# Patient Record
Sex: FEMALE | Race: BLACK OR AFRICAN AMERICAN | URBAN - METROPOLITAN AREA
[De-identification: names, ages, dates, MRNs, and addresses within clinical notes are randomized per-mention and may not be internally consistent; named-entity substitution may affect disease eponyms.]

---

## 2021-03-03 ENCOUNTER — OFFICE VISIT (OUTPATIENT)
Dept: FAMILY MEDICINE CLINIC | Age: 51
End: 2021-03-03
Payer: COMMERCIAL

## 2021-03-03 VITALS
RESPIRATION RATE: 16 BRPM | HEART RATE: 78 BPM | WEIGHT: 156 LBS | HEIGHT: 64 IN | BODY MASS INDEX: 26.63 KG/M2 | OXYGEN SATURATION: 98 % | DIASTOLIC BLOOD PRESSURE: 95 MMHG | SYSTOLIC BLOOD PRESSURE: 144 MMHG | TEMPERATURE: 98.1 F

## 2021-03-03 DIAGNOSIS — Z13.220 SCREENING FOR HYPERLIPIDEMIA: ICD-10-CM

## 2021-03-03 DIAGNOSIS — I10 ESSENTIAL HYPERTENSION: Primary | ICD-10-CM

## 2021-03-03 PROCEDURE — 99204 OFFICE O/P NEW MOD 45 MIN: CPT | Performed by: FAMILY MEDICINE

## 2021-03-03 RX ORDER — LISINOPRIL AND HYDROCHLOROTHIAZIDE 12.5; 2 MG/1; MG/1
TABLET ORAL
COMMUNITY
Start: 2021-02-28 | End: 2021-03-03 | Stop reason: ALTCHOICE

## 2021-03-03 RX ORDER — AMLODIPINE BESYLATE 5 MG/1
5 TABLET ORAL DAILY
Qty: 90 TAB | Refills: 1 | Status: SHIPPED | OUTPATIENT
Start: 2021-03-03 | End: 2021-08-20

## 2021-03-03 RX ORDER — LEVOTHYROXINE SODIUM 150 UG/1
TABLET ORAL
COMMUNITY
Start: 2021-02-28 | End: 2021-03-21

## 2021-03-03 RX ORDER — HYDROCHLOROTHIAZIDE 25 MG/1
25 TABLET ORAL DAILY
Qty: 90 TAB | Refills: 1 | Status: SHIPPED | OUTPATIENT
Start: 2021-03-03 | End: 2021-08-20

## 2021-03-03 NOTE — PROGRESS NOTES
Chief Complaint   Patient presents with    New Patient   [address]    Complete Physical     Patient reports some concerns about HTN. she is a 48y.o. year old female who presents for evalution. Here to establish care  Recently quit her job due to job burnout and she thought that would make her bp better  She was put on prinzide recently and she checks bp at home and is high   She started exercising 2 weeks ago, she walks 2 times a week          Reviewed PmHx, RxHx, FmHx, SocHx, AllgHx and updated and dated in the chart   Social History     Socioeconomic History    Marital status:                         Occupational History       Social Needs                                        Tobacco Use    Smoking status: Light Tobacco Smoker     Types: Cigars    Smokeless tobacco: Never Used    Tobacco comment: Occas. Substance and Sexual Activity    Alcohol use: Yes     Comment: 1-2 glasses per week Wine    Drug use: Never    Sexual activity: Yes     Partners: Male     Aspirin yes ____   No__x__ N/A____    There are no active problems to display for this patient.       Nurse notes were reviewed and copied and are correct  Review of Systems - negative except as listed above in the HPI    Objective:     Vitals:    03/03/21 1306 03/03/21 1332   BP: (!) 157/90 (!) 144/95   Pulse: 78    Resp: 16    Temp: 98.1 °F (36.7 °C)    TempSrc: Skin    SpO2: 98%    Weight: 156 lb (70.8 kg)    Height: 5' 3.5\" (1.613 m)      Physical Examination: General appearance - alert, well appearing, and in no distress   Mental status - alert, oriented to person, place, and time  Eyes - pupils equal and reactive, extraocular eye movements intact  Ears - bilateral TM's and external ear canals normal  Neck - supple, no significant adenopathy  Lymphatics - no palpable lymphadenopathy, no hepatosplenomegaly  Chest - clear to auscultation, no wheezes, rales or rhonchi, symmetric air entry  Heart - normal rate, regular rhythm, normal S1, S2, no murmurs, rubs, clicks or gallops  Abdomen - soft, nontender, nondistended, no masses or organomegaly  Neurological - alert, oriented, normal speech, no focal findings or movement disorder noted  Musculoskeletal - no joint tenderness, deformity or swelling  Extremities - peripheral pulses normal, no pedal edema, no clubbing or cyanosis  Skin - normal coloration and turgor, no rashes, no suspicious skin lesions noted         Assessment/ Plan:   Diagnoses and all orders for this visit:    1. Essential hypertension  -     hydroCHLOROthiazide (HYDRODIURIL) 25 mg tablet; Take 1 Tab by mouth daily. -     amLODIPine (NORVASC) 5 mg tablet; Take 1 Tab by mouth daily.  -     METABOLIC PANEL, COMPREHENSIVE; Future  -     TSH 3RD GENERATION; Future  Continue exercise at least 150 mins per week  Avoid sodium and stress    2. Screening for hyperlipidemia  -     LIPID PANEL; Future           ICD-10-CM ICD-9-CM    1. Essential hypertension  I10 401.9 hydroCHLOROthiazide (HYDRODIURIL) 25 mg tablet      amLODIPine (NORVASC) 5 mg tablet      METABOLIC PANEL, COMPREHENSIVE      TSH 3RD GENERATION      METABOLIC PANEL, COMPREHENSIVE      TSH 3RD GENERATION   2. Screening for hyperlipidemia  Z13.220 V77.91 LIPID PANEL      LIPID PANEL       I have discussed the diagnosis with the patient and the intended plan as seen in the above orders. The patient has received an after-visit summary and questions were answered concerning future plans. There are no Patient Instructions on file for this visit.

## 2021-03-03 NOTE — PROGRESS NOTES
1. Have you been to the ER, urgent care clinic since your last visit? Hospitalized since your last visit? No    2. Have you seen or consulted any other health care providers outside of the [address] [place] [address] since your last visit? Include any pap smears or colon screening. No      Дмитрий Pérez  Phone: [phone]  [facility]  Phone: [phone]    Prior PCP  [address]  Phone: [phone]    Chief Complaint   Patient presents with    New Patient    Establish Care    Complete Physical     Patient reports some concerns about HTN.       Visit Vitals  BP (!) 157/90 (BP 1 Location: Left arm, BP Patient Position: Sitting, BP Cuff Size: Adult)   Pulse 78   Temp 98.1 °F (36.7 °C) (Skin)   Resp 16   Ht 5' 3.5\" (1.613 m)   Wt 156 lb (70.8 kg)   SpO2 98%   BMI 27.20 kg/m²     3 most recent PHQ Screens 3/3/2021   Little interest or pleasure in doing things Not at all   Feeling down, depressed, irritable, or hopeless Not at all   Total Score PHQ 2 0

## 2021-03-04 LAB
ALBUMIN SERPL-MCNC: 4.2 G/DL (ref 3.8–4.8)
ALBUMIN/GLOB SERPL: 1.3 {RATIO} (ref 1.2–2.2)
ALP SERPL-CCNC: 81 IU/L (ref 39–117)
ALT SERPL-CCNC: 14 IU/L (ref 0–32)
AST SERPL-CCNC: 14 IU/L (ref 0–40)
BILIRUB SERPL-MCNC: 0.6 MG/DL (ref 0–1.2)
BUN SERPL-MCNC: 14 MG/DL (ref 6–24)
BUN/CREAT SERPL: 16 (ref 9–23)
CALCIUM SERPL-MCNC: 9.5 MG/DL (ref 8.7–10.2)
CHLORIDE SERPL-SCNC: 104 MMOL/L (ref 96–106)
CHOLEST SERPL-MCNC: 185 MG/DL (ref 100–199)
CO2 SERPL-SCNC: 23 MMOL/L (ref 20–29)
CREAT SERPL-MCNC: 0.85 MG/DL (ref 0.57–1)
GLOBULIN SER CALC-MCNC: 3.2 G/DL (ref 1.5–4.5)
GLUCOSE SERPL-MCNC: 90 MG/DL (ref 65–99)
HDLC SERPL-MCNC: 44 MG/DL
LDLC SERPL CALC-MCNC: 130 MG/DL (ref 0–99)
POTASSIUM SERPL-SCNC: 4.2 MMOL/L (ref 3.5–5.2)
PROT SERPL-MCNC: 7.4 G/DL (ref 6–8.5)
SODIUM SERPL-SCNC: 142 MMOL/L (ref 134–144)
TRIGL SERPL-MCNC: 60 MG/DL (ref 0–149)
TSH SERPL DL<=0.005 MIU/L-ACNC: 0.01 UIU/ML (ref 0.45–4.5)
VLDLC SERPL CALC-MCNC: 11 MG/DL (ref 5–40)

## 2021-03-18 ENCOUNTER — OFFICE VISIT (OUTPATIENT)
Dept: FAMILY MEDICINE CLINIC | Age: 51
End: 2021-03-18
Payer: COMMERCIAL

## 2021-03-18 VITALS
HEART RATE: 90 BPM | DIASTOLIC BLOOD PRESSURE: 83 MMHG | HEIGHT: 64 IN | OXYGEN SATURATION: 99 % | TEMPERATURE: 97.3 F | RESPIRATION RATE: 16 BRPM | BODY MASS INDEX: 26.46 KG/M2 | SYSTOLIC BLOOD PRESSURE: 136 MMHG | WEIGHT: 155 LBS

## 2021-03-18 DIAGNOSIS — R79.89 ABNORMAL TSH: ICD-10-CM

## 2021-03-18 DIAGNOSIS — I10 ESSENTIAL HYPERTENSION: Primary | ICD-10-CM

## 2021-03-18 PROCEDURE — 99214 OFFICE O/P EST MOD 30 MIN: CPT | Performed by: FAMILY MEDICINE

## 2021-03-18 NOTE — PROGRESS NOTES
Chief Complaint   Patient presents with    Follow-up    Hypertension    Cholesterol Problem     she is a 48y.o. year old female who presents for evalution. She was taking lisinopril combined w hctz  2 weeks ago and bp was not controlled  I switched to norvasc and kept hctz at 25 mg  She is now well controlled    Reviewed PmHx, RxHx, FmHx, SocHx, AllgHx and updated and dated in the chart. Aspirin yes ____   No____ N/A____    There are no active problems to display for this patient. Nurse notes were reviewed and copied and are correct  Review of Systems - negative except as listed above in the HPI    Objective:     Vitals:    03/18/21 1509   BP: 136/83   Pulse: 90   Resp: 16   Temp: 97.3 °F (36.3 °C)   TempSrc: Skin   SpO2: 99%   Weight: 155 lb (70.3 kg)   Height: 5' 3.5\" (1.613 m)       Physical Examination: General appearance - alert, well appearing, and in no distress  Mental status - alert, oriented to person, place, and time  Chest - clear to auscultation, no wheezes, rales or rhonchi, symmetric air entry  Heart - normal rate, regular rhythm, normal S1, S2, no murmurs, rubs, clicks or gallops      Assessment/ Plan:   Diagnoses and all orders for this visit:    1. Essential hypertension  Cont same meds  2. Abnormal TSH  -     TSH 3RD GENERATION; Future  -     T3 TOTAL; Future  -     T4, FREE; Future    Need to determine if the current dose is still appropriate      ICD-10-CM ICD-9-CM    1. Essential hypertension  I10 401.9    2. Abnormal TSH  R79.89 790.6 TSH 3RD GENERATION      T3 TOTAL      T4, FREE      T4, FREE      T3 TOTAL      TSH 3RD GENERATION      CANCELED: TSH 3RD GENERATION      CANCELED: T4, FREE      CANCELED: T3 TOTAL      CANCELED: TSH 3RD GENERATION      CANCELED: T4, FREE      CANCELED: T3 TOTAL       I have discussed the diagnosis with the patient and the intended plan as seen in the above orders.   The patient has received an after-visit summary and questions were answered concerning future plans. There are no Patient Instructions on file for this visit.

## 2021-03-18 NOTE — PROGRESS NOTES
1. Have you been to the ER, urgent care clinic since your last visit? Hospitalized since your last visit? No    2. Have you seen or consulted any other health care providers outside of the [address] [place] [address] since your last visit? Include any pap smears or colon screening.  No     Chief Complaint   Patient presents with    Follow-up    Hypertension    Cholesterol Problem     Health Maintenance Due   Topic Date Due    Hepatitis C Screening  Never done    Pneumococcal 0-64 years (1 of 1 - PPSV23) Never done    COVID-19 Vaccine (1) Never done    DTaP/Tdap/Td series (1 - Tdap) Never done    PAP AKA CERVICAL CYTOLOGY  Never done    Shingrix Vaccine Age 50> (1 of 2) Never done    Colorectal Cancer Screening Combo  Never done    Breast Cancer Screen Mammogram  Never done    Flu Vaccine (1) Never done     Visit Vitals  /83 (BP 1 Location: Left arm, BP Patient Position: Sitting, BP Cuff Size: Adult long)   Pulse 90   Temp 97.3 °F (36.3 °C) (Skin)   Resp 16   Ht 5' 3.5\" (1.613 m)   Wt 155 lb (70.3 kg)   SpO2 99%   BMI 27.03 kg/m²

## 2021-03-19 LAB
T4 FREE SERPL-MCNC: 1.9 NG/DL (ref 0.8–1.5)
TSH SERPL DL<=0.05 MIU/L-ACNC: <0.01 UIU/ML (ref 0.36–3.74)

## 2021-03-20 LAB — T3 SERPL-MCNC: 142 NG/DL (ref 71–180)

## 2021-03-21 DIAGNOSIS — E03.9 ACQUIRED HYPOTHYROIDISM: Primary | ICD-10-CM

## 2021-03-21 RX ORDER — LEVOTHYROXINE SODIUM 137 UG/1
137 TABLET ORAL
Qty: 90 TAB | Refills: 1 | Status: SHIPPED | OUTPATIENT
Start: 2021-03-21 | End: 2021-05-12

## 2021-03-21 NOTE — PROGRESS NOTES
The thyroid level is higher than it should be. The pill dose needs to be reduced  I am decreasing the pill from 150 MCG to 137 mcg. We need to repeat the test in a month after starting the new dose.

## 2021-03-26 ENCOUNTER — TELEPHONE (OUTPATIENT)
Dept: FAMILY MEDICINE CLINIC | Age: 51
End: 2021-03-26

## 2021-03-26 NOTE — TELEPHONE ENCOUNTER
Medical record request sent via fax to [facility], [place]  Fax#: [phone]  File#: [id number]  Entire Record  Pap  Mammo

## 2021-05-06 ENCOUNTER — OFFICE VISIT (OUTPATIENT)
Dept: FAMILY MEDICINE CLINIC | Age: 51
End: 2021-05-06
Payer: COMMERCIAL

## 2021-05-06 VITALS
BODY MASS INDEX: 26.29 KG/M2 | RESPIRATION RATE: 16 BRPM | DIASTOLIC BLOOD PRESSURE: 83 MMHG | HEIGHT: 64 IN | WEIGHT: 154 LBS | HEART RATE: 74 BPM | OXYGEN SATURATION: 97 % | SYSTOLIC BLOOD PRESSURE: 133 MMHG | TEMPERATURE: 97.8 F

## 2021-05-06 DIAGNOSIS — E78.00 HYPERCHOLESTEROLEMIA: ICD-10-CM

## 2021-05-06 DIAGNOSIS — Z13.1 SCREENING FOR DIABETES MELLITUS: ICD-10-CM

## 2021-05-06 DIAGNOSIS — Z12.11 SCREEN FOR COLON CANCER: ICD-10-CM

## 2021-05-06 DIAGNOSIS — R79.89 ABNORMAL TSH: ICD-10-CM

## 2021-05-06 DIAGNOSIS — Z11.59 NEED FOR HEPATITIS C SCREENING TEST: ICD-10-CM

## 2021-05-06 DIAGNOSIS — I10 ESSENTIAL HYPERTENSION: Primary | ICD-10-CM

## 2021-05-06 LAB
ALBUMIN SERPL-MCNC: 3.9 G/DL (ref 3.5–5)
ALBUMIN/GLOB SERPL: 1.1 {RATIO} (ref 1.1–2.2)
ALP SERPL-CCNC: 86 U/L (ref 45–117)
ALT SERPL-CCNC: 20 U/L (ref 12–78)
ANION GAP SERPL CALC-SCNC: 7 MMOL/L (ref 5–15)
AST SERPL-CCNC: 13 U/L (ref 15–37)
BILIRUB SERPL-MCNC: 0.7 MG/DL (ref 0.2–1)
BUN SERPL-MCNC: 12 MG/DL (ref 6–20)
BUN/CREAT SERPL: 17 (ref 12–20)
CALCIUM SERPL-MCNC: 9.3 MG/DL (ref 8.5–10.1)
CHLORIDE SERPL-SCNC: 104 MMOL/L (ref 97–108)
CHOLEST SERPL-MCNC: 200 MG/DL
CO2 SERPL-SCNC: 30 MMOL/L (ref 21–32)
CREAT SERPL-MCNC: 0.69 MG/DL (ref 0.55–1.02)
EST. AVERAGE GLUCOSE BLD GHB EST-MCNC: 111 MG/DL
GLOBULIN SER CALC-MCNC: 3.5 G/DL (ref 2–4)
GLUCOSE SERPL-MCNC: 96 MG/DL (ref 65–100)
HBA1C MFR BLD: 5.5 % (ref 4–5.6)
HCV AB SERPL QL IA: NONREACTIVE
HCV COMMENT,HCGAC: NORMAL
HDLC SERPL-MCNC: 61 MG/DL
HDLC SERPL: 3.3 {RATIO} (ref 0–5)
LDLC SERPL CALC-MCNC: 121.4 MG/DL (ref 0–100)
LIPID PROFILE,FLP: ABNORMAL
POTASSIUM SERPL-SCNC: 3.5 MMOL/L (ref 3.5–5.1)
PROT SERPL-MCNC: 7.4 G/DL (ref 6.4–8.2)
SODIUM SERPL-SCNC: 141 MMOL/L (ref 136–145)
TRIGL SERPL-MCNC: 88 MG/DL (ref ?–150)
TSH SERPL DL<=0.05 MIU/L-ACNC: 0.01 UIU/ML (ref 0.36–3.74)
VLDLC SERPL CALC-MCNC: 17.6 MG/DL

## 2021-05-06 PROCEDURE — 99214 OFFICE O/P EST MOD 30 MIN: CPT | Performed by: FAMILY MEDICINE

## 2021-05-06 NOTE — PROGRESS NOTES
1. Have you been to the ER, urgent care clinic since your last visit? Hospitalized since your last visit? No    2. Have you seen or consulted any other health care providers outside of the [address] [place] [address] since your last visit? Include any pap smears or colon screening. No     Chief Complaint   Patient presents with    Complete Physical     No pap followed by Obgyn     Visit Vitals  /83 (BP 1 Location: Left arm, BP Patient Position: Sitting, BP Cuff Size: Adult)   Pulse 74   Temp 97.8 °F (36.6 °C) (Skin)   Resp 16   Ht 5' 3.5\" (1.613 m)   Wt 154 lb (69.9 kg)   SpO2 97%   BMI 26.85 kg/m²       Health Maintenance Due   Topic Date Due    Hepatitis C Screening  Never done    Pneumococcal 0-64 years (1 of 1 - PPSV23) Never done    COVID-19 Vaccine (1) Never done    DTaP/Tdap/Td series (1 - Tdap) Never done    PAP AKA CERVICAL CYTOLOGY  Never done    Shingrix Vaccine Age 50> (1 of 2) Never done    Colorectal Cancer Screening Combo  Never done    Breast Cancer Screen Mammogram  Never done     3 most recent PHQ Screens 5/6/2021   Little interest or pleasure in doing things Not at all   Feeling down, depressed, irritable, or hopeless Not at all   Total Score PHQ 2 0     Abuse Screening Questionnaire 5/6/2021   Do you ever feel afraid of your partner? N   Are you in a relationship with someone who physically or mentally threatens you? N   Is it safe for you to go home?  Dominick Solis

## 2021-05-06 NOTE — PROGRESS NOTES
Chief Complaint   Patient presents with    Complete Physical     No pap followed by Obgyn     she is a 48y.o. year old female who presents for evalution. mammagram and pap done Dec  Her tsh was abn last month and the dose was decreased  She needs a repeat tsh  No c/o of any chect pain or SOB or rhythm disturbances  Because she has some acute and chronic issues carlota need to be addressed today I will address those and do a physical on a separate visit  Reviewed PmHx, RxHx, FmHx, SocHx, AllgHx and updated and dated in the chart. Aspirin yes ____   No____ N/A____    There are no active problems to display for this patient. Nurse notes were reviewed and copied and are correct  Review of Systems - negative except as listed above in the HPI    Objective:     Vitals:    05/06/21 1307   BP: 133/83   Pulse: 74   Resp: 16   Temp: 97.8 °F (36.6 °C)   TempSrc: Skin   SpO2: 97%   Weight: 154 lb (69.9 kg)   Height: 5' 3.5\" (1.613 m)     Physical Examination: General appearance - alert, well appearing, and in no distress  Mental status - alert, oriented to person, place, and time  Eyes - pupils equal and reactive, extraocular eye movements intact  Ears - bilateral TM's and external ear canals normal  Neck - supple, no significant adenopathy  Chest - clear to auscultation, no wheezes, rales or rhonchi, symmetric air entry  Heart - normal rate, regular rhythm, normal S1, S2, no murmurs, rubs, clicks or gallops  Abdomen - soft, nontender, nondistended, no masses or organomegaly  Musculoskeletal - no joint tenderness, deformity or swelling  Extremities - peripheral pulses normal, no pedal edema, no clubbing or cyanosis         Assessment/ Plan:   Diagnoses and all orders for this visit:    1. Essential hypertension  -     METABOLIC PANEL, COMPREHENSIVE; Future  -     TSH 3RD GENERATION; Future  -     METABOLIC PANEL, COMPREHENSIVE; Future  Well controlled on current meds  2.  Abnormal TSH  -     TSH 3RD GENERATION; Future  Will adjust as needed  3. Need for hepatitis C screening test  -     HEPATITIS C AB; Future    4. Screening for diabetes mellitus  -     HEMOGLOBIN A1C WITH EAG; Future    5. Hypercholesterolemia  -     LIPID PANEL; Future    6. Screen for colon cancer  -     REFERRAL TO GASTROENTEROLOGY           ICD-10-CM ICD-9-CM    1. Essential hypertension  G16 619.6 METABOLIC PANEL, COMPREHENSIVE      HEMOGLOBIN A1C WITH EAG      METABOLIC PANEL, COMPREHENSIVE      TSH 3RD GENERATION      METABOLIC PANEL, COMPREHENSIVE      METABOLIC PANEL, COMPREHENSIVE      TSH 3RD GENERATION      CANCELED: HEPATITIS C AB      CANCELED: HEMOGLOBIN A1C WITH EAG      CANCELED: TSH 3RD GENERATION      CANCELED: HEPATITIS C AB      CANCELED: TSH 3RD GENERATION   2. Abnormal TSH  R79.89 790.6 TSH 3RD GENERATION      TSH 3RD GENERATION      CANCELED: TSH 3RD GENERATION      CANCELED: TSH 3RD GENERATION   3. Need for hepatitis C screening test  Z11.59 V73.89 HEPATITIS C AB      HEPATITIS C AB      CANCELED: HEPATITIS C AB      CANCELED: HEPATITIS C AB   4. Screening for diabetes mellitus  Z13.1 V77.1 HEMOGLOBIN A1C WITH EAG      HEMOGLOBIN A1C WITH EAG      HEMOGLOBIN A1C WITH EAG      CANCELED: HEMOGLOBIN A1C WITH EAG   5. Hypercholesterolemia  E78.00 272.0 LIPID PANEL      LIPID PANEL      CANCELED: LIPID PANEL      CANCELED: LIPID PANEL   6. Screen for colon cancer  Z12.11 V76.51 REFERRAL TO GASTROENTEROLOGY       I have discussed the diagnosis with the patient and the intended plan as seen in the above orders. The patient has received an after-visit summary and questions were answered concerning future plans. There are no Patient Instructions on file for this visit.     The patient verbalizes understanding and agrees with the plan of care        Patient has the advanced directives booklet to review

## 2021-05-12 DIAGNOSIS — E03.9 ACQUIRED HYPOTHYROIDISM: ICD-10-CM

## 2021-05-12 RX ORDER — LEVOTHYROXINE SODIUM 125 UG/1
125 TABLET ORAL
Qty: 90 TAB | Refills: 1 | Status: SHIPPED | OUTPATIENT
Start: 2021-05-12 | End: 2022-06-27 | Stop reason: SDUPTHER

## 2021-05-13 NOTE — PROGRESS NOTES
The hep c is neg  The blood sugar is normal  Your cholesterol numbers are not at goal. To provide you with the best heart health the LDL should be under 100 if you have no heart disease or history of diabetes. If you have a history of diabetes or heart disease the LDL goal should be less than 70. Avoid fried food, fast food and junk food. Also move more each day. aim for at least 150 minutes of activity each week. I want to recheck the cholesterol levels in three months  The thyroid level is too high. I want to decrease the pill dose to 125 mcg.  The level can be rechecked in 3 months

## 2022-01-03 DIAGNOSIS — I10 ESSENTIAL HYPERTENSION: ICD-10-CM

## 2022-01-07 RX ORDER — AMLODIPINE BESYLATE 5 MG/1
TABLET ORAL
Qty: 90 TABLET | Refills: 0 | Status: SHIPPED | OUTPATIENT
Start: 2022-01-07 | End: 2022-04-13

## 2022-01-07 RX ORDER — HYDROCHLOROTHIAZIDE 25 MG/1
TABLET ORAL
Qty: 90 TABLET | Refills: 0 | Status: SHIPPED | OUTPATIENT
Start: 2022-01-07 | End: 2022-04-13

## 2022-01-07 NOTE — TELEPHONE ENCOUNTER
PCP: Maicol Mcguire MD    Last appt: 5/6/2021  No future appointments.     Requested Prescriptions     Pending Prescriptions Disp Refills    amLODIPine (NORVASC) 5 mg tablet [Pharmacy Med Name: AMLODIPINE BESYLATE 5MG TABLETS] 90 Tablet 0     Sig: TAKE 1 TABLET BY MOUTH DAILY    hydroCHLOROthiazide (HYDRODIURIL) 25 mg tablet [Pharmacy Med Name: HYDROCHLOROTHIAZIDE 25MG TABLETS] 90 Tablet 0     Sig: TAKE 1 TABLET BY MOUTH DAILY       Prior labs and Blood pressures:  BP Readings from Last 3 Encounters:   05/06/21 133/83   03/18/21 136/83   03/03/21 (!) 144/95     Lab Results   Component Value Date/Time    Sodium 141 05/06/2021 01:49 PM    Potassium 3.5 05/06/2021 01:49 PM    Chloride 104 05/06/2021 01:49 PM    CO2 30 05/06/2021 01:49 PM    Anion gap 7 05/06/2021 01:49 PM    Glucose 96 05/06/2021 01:49 PM    BUN 12 05/06/2021 01:49 PM    Creatinine 0.69 05/06/2021 01:49 PM    BUN/Creatinine ratio 17 05/06/2021 01:49 PM    GFR est AA >60 05/06/2021 01:49 PM    GFR est non-AA >60 05/06/2021 01:49 PM    Calcium 9.3 05/06/2021 01:49 PM     Lab Results   Component Value Date/Time    Hemoglobin A1c 5.5 05/06/2021 01:49 PM     Lab Results   Component Value Date/Time    Cholesterol, total 200 (H) 05/06/2021 01:49 PM    HDL Cholesterol 61 05/06/2021 01:49 PM    LDL, calculated 121.4 (H) 05/06/2021 01:49 PM    VLDL, calculated 17.6 05/06/2021 01:49 PM    Triglyceride 88 05/06/2021 01:49 PM    CHOL/HDL Ratio 3.3 05/06/2021 01:49 PM     No results found for: VITD3, XQVID2, XQVID3, XQVID, VD3RIA    Lab Results   Component Value Date/Time    TSH 0.01 (L) 05/06/2021 01:49 PM

## 2022-04-13 DIAGNOSIS — I10 ESSENTIAL HYPERTENSION: ICD-10-CM

## 2022-04-13 RX ORDER — AMLODIPINE BESYLATE 5 MG/1
TABLET ORAL
Qty: 90 TABLET | Refills: 0 | Status: SHIPPED | OUTPATIENT
Start: 2022-04-13 | End: 2022-07-26

## 2022-04-13 RX ORDER — HYDROCHLOROTHIAZIDE 25 MG/1
TABLET ORAL
Qty: 90 TABLET | Refills: 0 | Status: SHIPPED | OUTPATIENT
Start: 2022-04-13 | End: 2022-07-26

## 2022-06-27 ENCOUNTER — OFFICE VISIT (OUTPATIENT)
Dept: FAMILY MEDICINE CLINIC | Age: 52
End: 2022-06-27
Payer: COMMERCIAL

## 2022-06-27 VITALS
HEART RATE: 99 BPM | WEIGHT: 161.8 LBS | SYSTOLIC BLOOD PRESSURE: 130 MMHG | HEIGHT: 64 IN | BODY MASS INDEX: 27.62 KG/M2 | TEMPERATURE: 98.1 F | DIASTOLIC BLOOD PRESSURE: 91 MMHG | OXYGEN SATURATION: 99 % | RESPIRATION RATE: 20 BRPM

## 2022-06-27 DIAGNOSIS — S80.12XA CONTUSION OF LEFT LOWER LEG, INITIAL ENCOUNTER: ICD-10-CM

## 2022-06-27 DIAGNOSIS — E03.9 ACQUIRED HYPOTHYROIDISM: ICD-10-CM

## 2022-06-27 DIAGNOSIS — I10 ESSENTIAL HYPERTENSION: ICD-10-CM

## 2022-06-27 DIAGNOSIS — E03.9 ACQUIRED HYPOTHYROIDISM: Primary | ICD-10-CM

## 2022-06-27 PROCEDURE — 99214 OFFICE O/P EST MOD 30 MIN: CPT | Performed by: FAMILY MEDICINE

## 2022-06-27 NOTE — TELEPHONE ENCOUNTER
Please advise!!   Patient called back in because she went to the pharmacy to  medications and they didn't have her levothyroxine called in. Nessa Nielsen PCP: Americo Tinoco MD    Last appt: 6/27/2022  No future appointments.     Requested Prescriptions      No prescriptions requested or ordered in this encounter       Prior labs and Blood pressures:  BP Readings from Last 3 Encounters:   06/27/22 (!) 130/91   05/06/21 133/83   03/18/21 136/83     Lab Results   Component Value Date/Time    Sodium 141 05/06/2021 01:49 PM    Potassium 3.5 05/06/2021 01:49 PM    Chloride 104 05/06/2021 01:49 PM    CO2 30 05/06/2021 01:49 PM    Anion gap 7 05/06/2021 01:49 PM    Glucose 96 05/06/2021 01:49 PM    BUN 12 05/06/2021 01:49 PM    Creatinine 0.69 05/06/2021 01:49 PM    BUN/Creatinine ratio 17 05/06/2021 01:49 PM    GFR est AA >60 05/06/2021 01:49 PM    GFR est non-AA >60 05/06/2021 01:49 PM    Calcium 9.3 05/06/2021 01:49 PM     Lab Results   Component Value Date/Time    Hemoglobin A1c 5.5 05/06/2021 01:49 PM     Lab Results   Component Value Date/Time    Cholesterol, total 200 (H) 05/06/2021 01:49 PM    HDL Cholesterol 61 05/06/2021 01:49 PM    LDL, calculated 121.4 (H) 05/06/2021 01:49 PM    VLDL, calculated 17.6 05/06/2021 01:49 PM    Triglyceride 88 05/06/2021 01:49 PM    CHOL/HDL Ratio 3.3 05/06/2021 01:49 PM     No results found for: MTBU3, BQWWE8, NAVPQ4, CHDQU, VD3RIA    Lab Results   Component Value Date/Time    TSH 0.01 (L) 05/06/2021 01:49 PM

## 2022-06-27 NOTE — PROGRESS NOTES
Chief Complaint   Patient presents with    Fall     Requesting TDAP     Kimber Hector 06/18/22    Medication Refill     Thyroid medication     she is a 46y.o. year old female who presents for evalution. She is out of levothyroxine since the 10th of June  She did not take the bp meds today, she is feeling stressed a  Bit lately  She has been eating out more lately  She ate out last night and the day before and almost every day  She fell on a tent stake a week ago. She did not get a tetanus yet    Reviewed PmHx, RxHx, FmHx, SocHx, AllgHx and updated and dated in the chart. Aspirin yes ____   No____ N/A____    There are no problems to display for this patient. Nurse notes were reviewed and copied and are correct  Review of Systems - negative except as listed above in the HPI    Objective:     Vitals:    06/27/22 1015 06/27/22 1019 06/27/22 1034   BP: (!) 160/95 (!) 160/85 (!) 130/91   Pulse: 99     Resp: 20     Temp: 98.1 °F (36.7 °C)     TempSrc: Temporal     SpO2: 99%     Weight: 161 lb 12.8 oz (73.4 kg)     Height: 5' 3.5\" (1.613 m)        Physical Examination: General appearance - alert, well appearing, and in no distress  Mental status - alert, oriented to person, place, and time  Neck - supple, no significant adenopathy  Chest - clear to auscultation, no wheezes, rales or rhonchi, symmetric air entry  Heart - normal rate, regular rhythm, normal S1, S2, no murmurs, rubs, clicks or gallops  Extremities - Left ant tibia healing open wound, no edema , no purulence        Assessment/ Plan:   Diagnoses and all orders for this visit:    1. Acquired hypothyroidism  -     TSH 3RD GENERATION; Future    2. Essential hypertension  -     METABOLIC PANEL, COMPREHENSIVE; Future    3.  Contusion of left lower leg, initial encounter     cont the same dose levothyroxine  Cont 5 mg amlodpine and 25 mg hctz  Check bp at home  Recheck here in 2 weeks   find out if insurance covers tdap here or at pharmacy  the leg is healing welll    ICD-10-CM ICD-9-CM    1. Acquired hypothyroidism  E03.9 244.9 TSH 3RD GENERATION      TSH 3RD GENERATION   2. Essential hypertension  F06 610.5 METABOLIC PANEL, COMPREHENSIVE      METABOLIC PANEL, COMPREHENSIVE   3. Contusion of left lower leg, initial encounter  S80.12XA 924.10        I have discussed the diagnosis with the patient and the intended plan as seen in the above orders. The patient has received an after-visit summary  if not [facility] and questions were answered concerning future plans. Patients [place] [facility] have access to avs without printing    Medication Side Effects and Warnings were discussed with patient:   Patient Labs were reviewed and or requested:   Patient Past Records were reviewed and or requested: yes        There are no Patient Instructions on file for this visit.

## 2022-07-05 RX ORDER — LEVOTHYROXINE SODIUM 125 UG/1
125 TABLET ORAL
Qty: 90 TABLET | Refills: 1 | Status: SHIPPED | OUTPATIENT
Start: 2022-07-05

## 2022-07-22 DIAGNOSIS — I10 ESSENTIAL HYPERTENSION: ICD-10-CM

## 2022-07-26 RX ORDER — HYDROCHLOROTHIAZIDE 25 MG/1
TABLET ORAL
Qty: 90 TABLET | Refills: 0 | Status: SHIPPED | OUTPATIENT
Start: 2022-07-26 | End: 2022-09-30

## 2022-07-26 RX ORDER — AMLODIPINE BESYLATE 5 MG/1
TABLET ORAL
Qty: 90 TABLET | Refills: 0 | Status: SHIPPED | OUTPATIENT
Start: 2022-07-26 | End: 2022-09-30

## 2023-01-15 DIAGNOSIS — E03.9 ACQUIRED HYPOTHYROIDISM: ICD-10-CM

## 2023-01-15 RX ORDER — LEVOTHYROXINE SODIUM 125 UG/1
TABLET ORAL
Qty: 90 TABLET | Refills: 1 | Status: SHIPPED | OUTPATIENT
Start: 2023-01-15

## 2023-03-02 ENCOUNTER — OFFICE VISIT (OUTPATIENT)
Dept: FAMILY MEDICINE CLINIC | Age: 53
End: 2023-03-02
Payer: COMMERCIAL

## 2023-03-02 VITALS
BODY MASS INDEX: 28.85 KG/M2 | DIASTOLIC BLOOD PRESSURE: 95 MMHG | SYSTOLIC BLOOD PRESSURE: 147 MMHG | RESPIRATION RATE: 16 BRPM | HEART RATE: 100 BPM | TEMPERATURE: 97.6 F | OXYGEN SATURATION: 99 % | WEIGHT: 169 LBS | HEIGHT: 64 IN

## 2023-03-02 DIAGNOSIS — Z12.11 SCREEN FOR COLON CANCER: ICD-10-CM

## 2023-03-02 DIAGNOSIS — E03.9 ACQUIRED HYPOTHYROIDISM: Primary | ICD-10-CM

## 2023-03-02 DIAGNOSIS — G47.8 UNREFRESHED BY SLEEP: ICD-10-CM

## 2023-03-02 DIAGNOSIS — R06.83 SNORING: ICD-10-CM

## 2023-03-02 DIAGNOSIS — R40.0 DAYTIME SOMNOLENCE: ICD-10-CM

## 2023-03-02 PROCEDURE — 99213 OFFICE O/P EST LOW 20 MIN: CPT | Performed by: FAMILY MEDICINE

## 2023-03-02 NOTE — PROGRESS NOTES
Chief Complaint   Patient presents with    Hypertension    Menopause    Weight Gain     Follow up       she is a 46y.o. year old female who presents for evalution. She had chinese food for lunch and it was very salty  Her bp is high now  She says it has been 118/80s at home    She says since she started menopause her weight has been going up  She says she went on a \" diet\" where she ate more vegetables for 21 days and ate smaller portions  She walks 2-3 times a week for 30 mins but not consistent    She snores, daytime somnolence, unrefreshed by sleep    Reviewed PmHx, RxHx, FmHx, SocHx, AllgHx and updated and dated in the chart. Aspirin yes ____   No____ N/A____    There are no problems to display for this patient. Nurse notes were reviewed and copied and are correct  Review of Systems - negative except as listed above in the HPI    Objective:     Vitals:    03/02/23 1449 03/02/23 1457   BP: (!) 146/86 (!) 147/95   Pulse: 100    Resp: 16    Temp: 97.6 °F (36.4 °C)    TempSrc: Skin    SpO2: 99%    Weight: 169 lb (76.7 kg)    Height: 5' 3.5\" (1.613 m)      Physical Examination: General appearance - alert, well appearing, and in no distress  Mental status - alert, oriented to person, place, and time  Neck - supple, no significant adenopathy  Chest - clear to auscultation, no wheezes, rales or rhonchi, symmetric air entry  Heart - normal rate, regular rhythm, normal S1, S2, no murmurs, rubs, clicks or gallops         Assessment/ Plan:   Diagnoses and all orders for this visit:    1. Acquired hypothyroidism  Continue levothyroxine 125 mcg a day  2. Snoring  -     SLEEP MEDICINE REFERRAL    3. Unrefreshed by sleep  -     SLEEP MEDICINE REFERRAL    4. Daytime somnolence  -     SLEEP MEDICINE REFERRAL    5. Screen for colon cancer  -     REFERRAL TO GASTROENTEROLOGY         ICD-10-CM ICD-9-CM    1. Acquired hypothyroidism  E03.9 244.9       2. Snoring  R06.83 786.09 SLEEP MEDICINE REFERRAL      3.  Unrefreshed by sleep  G47.8 780.59 SLEEP MEDICINE REFERRAL      4. Daytime somnolence  R40.0 780.54 SLEEP MEDICINE REFERRAL      5. Screen for colon cancer  Z12.11 V76.51 REFERRAL TO GASTROENTEROLOGY          I have discussed the diagnosis with the patient and the intended plan as seen in the above orders. The patient has received an after-visit summary  if not on mychart and questions were answered concerning future plans. Patients in [facility] have access to avs without printing    Medication Side Effects and Warnings were discussed with patient:   Patient Labs were reviewed and or requested:   Patient Past Records were reviewed and or requested: yes        There are no Patient Instructions on file for this visit.

## 2023-03-02 NOTE — PROGRESS NOTES
1. Have you been to the ER, urgent care clinic since your last visit? Hospitalized since your last visit? No    2. Have you seen or consulted any other health care providers outside of the [address] [place] [address] since your last visit? Include any pap smears or colon screening. No    Chief Complaint   Patient presents with    Hypertension    Menopause    Weight Gain     Follow up       3 most recent PHQ Screens 3/2/2023   Little interest or pleasure in doing things Not at all   Feeling down, depressed, irritable, or hopeless Not at all   Total Score PHQ 2 0   Trouble falling or staying asleep, or sleeping too much Not at all   Feeling tired or having little energy Not at all   Poor appetite, weight loss, or overeating Not at all   Feeling bad about yourself - or that you are a failure or have let yourself or your family down Not at all   Trouble concentrating on things such as school, work, reading, or watching TV Not at all   Moving or speaking so slowly that other people could have noticed; or the opposite being so fidgety that others notice Not at all   Thoughts of being better off dead, or hurting yourself in some way Not at all   PHQ 9 Score 0   How difficult have these problems made it for you to do your work, take care of your home and get along with others Not difficult at all     Abuse Screening Questionnaire 3/2/2023   Do you ever feel afraid of your partner? N   Are you in a relationship with someone who physically or mentally threatens you? N   Is it safe for you to go home?  Y     Visit Vitals  BP (!) 146/86 (BP 1 Location: Left upper arm, BP Patient Position: Sitting, BP Cuff Size: Adult)   Pulse 100   Temp 97.6 °F (36.4 °C) (Skin)   Resp 16   Ht 5' 3.5\" (1.613 m)   Wt 169 lb (76.7 kg)   SpO2 99%   BMI 29.47 kg/m²

## 2023-03-08 ENCOUNTER — TELEPHONE (OUTPATIENT)
Dept: SLEEP MEDICINE | Age: 53
End: 2023-03-08

## 2023-04-19 ENCOUNTER — PATIENT MESSAGE (OUTPATIENT)
Dept: FAMILY MEDICINE CLINIC | Age: 53
End: 2023-04-19

## 2023-05-26 ENCOUNTER — TELEPHONE (OUTPATIENT)
Facility: CLINIC | Age: 53
End: 2023-05-26

## 2023-06-01 ENCOUNTER — TELEPHONE (OUTPATIENT)
Facility: CLINIC | Age: 53
End: 2023-06-01

## 2023-06-01 NOTE — TELEPHONE ENCOUNTER
Pt is requesting a refill on her levothyroxine (SYNTHROID) 125 MCG tablet . She states that the Pharmacy sent in the request about 2 weeks ago and hasn't heard anything back. Pharmacy has been updated in her chart.

## 2023-06-02 ENCOUNTER — TELEPHONE (OUTPATIENT)
Facility: CLINIC | Age: 53
End: 2023-06-02

## 2023-06-02 RX ORDER — LEVOTHYROXINE SODIUM 0.12 MG/1
TABLET ORAL
Qty: 90 TABLET | Refills: 0 | Status: SHIPPED | OUTPATIENT
Start: 2023-06-02 | End: 2023-06-26 | Stop reason: SDUPTHER

## 2023-06-02 NOTE — TELEPHONE ENCOUNTER
Call patient. I refilled their medication but they are due to be seen in the office. Reason:  Her last lab work indicates that we need to adjust her thryoid medication dose. Needs appointment for further refills.
Pt needs supply to last until next appt for  levothyroxine (SYNTHROID) 125 MCG tablet .  Pt is scheduled with Dr Tristan Blood . Used to be Dr Jess Turpin Pt
(MDRD) Study equation, reported for both  Americans  (GFRAA) and non- Americans (GFRNA), and normalized to 1.73m2 body  surface area. The physician must decide which value applies to the patient. Calcium 05/06/2021 9.3  8.5 - 10.1 MG/DL Final    Total Bilirubin 05/06/2021 0.7  0.2 - 1.0 MG/DL Final    ALT 05/06/2021 20  12 - 78 U/L Final    AST 05/06/2021 13 (L)  15 - 37 U/L Final    Alkaline Phosphatase 05/06/2021 86  45 - 117 U/L Final    Total Protein 05/06/2021 7.4  6.4 - 8.2 g/dL Final    Albumin 05/06/2021 3.9  3.5 - 5.0 g/dL Final    Globulin 05/06/2021 3.5  2.0 - 4.0 g/dL Final    Albumin/Globulin Ratio 05/06/2021 1.1  1.1 - 2.2   Final    TSH 05/06/2021 0.01 (L)  0.36 - 3.74 uIU/mL Final    Comment:   Due to TSH heterogeneity, both structurally and degree of glycosylation,  monoclonal antibodies used in the TSH assay may not accurately quantitate TSH. Therefore, this result should be correlated with clinical findings as well as  with other assessments of thyroid function, e.g., free T4, free T3.       Hepatitis C Ab 05/06/2021 NONREACTIVE  NONREACTIVE   Final    Hepatitis C Comment 05/06/2021 Method used is [facility]    Final     Health Maintenance Due   Topic Date Due    Pneumococcal 0-64 years Vaccine (1 - PCV) Never done    HIV screen  Never done    DTaP/Tdap/Td vaccine (1 - Tdap) Never done    Colorectal Cancer Screen  Never done    Shingles vaccine (1 of 2) Never done    COVID-19 Vaccine (3 - Booster for [place] series) 06/16/2021    Breast cancer screen  09/17/2022

## 2023-06-26 ENCOUNTER — OFFICE VISIT (OUTPATIENT)
Facility: CLINIC | Age: 53
End: 2023-06-26
Payer: COMMERCIAL

## 2023-06-26 VITALS
HEIGHT: 64 IN | WEIGHT: 170.4 LBS | BODY MASS INDEX: 29.09 KG/M2 | RESPIRATION RATE: 16 BRPM | OXYGEN SATURATION: 98 % | DIASTOLIC BLOOD PRESSURE: 83 MMHG | TEMPERATURE: 97.8 F | SYSTOLIC BLOOD PRESSURE: 125 MMHG | HEART RATE: 82 BPM

## 2023-06-26 DIAGNOSIS — Z12.31 BREAST CANCER SCREENING BY MAMMOGRAM: ICD-10-CM

## 2023-06-26 DIAGNOSIS — E89.0 POSTOPERATIVE HYPOTHYROIDISM: ICD-10-CM

## 2023-06-26 DIAGNOSIS — I10 ESSENTIAL (PRIMARY) HYPERTENSION: Primary | ICD-10-CM

## 2023-06-26 DIAGNOSIS — E78.2 MIXED HYPERLIPIDEMIA: ICD-10-CM

## 2023-06-26 DIAGNOSIS — R06.83 SNORING: ICD-10-CM

## 2023-06-26 PROCEDURE — 3074F SYST BP LT 130 MM HG: CPT | Performed by: STUDENT IN AN ORGANIZED HEALTH CARE EDUCATION/TRAINING PROGRAM

## 2023-06-26 PROCEDURE — 3079F DIAST BP 80-89 MM HG: CPT | Performed by: STUDENT IN AN ORGANIZED HEALTH CARE EDUCATION/TRAINING PROGRAM

## 2023-06-26 PROCEDURE — 99214 OFFICE O/P EST MOD 30 MIN: CPT | Performed by: STUDENT IN AN ORGANIZED HEALTH CARE EDUCATION/TRAINING PROGRAM

## 2023-06-26 RX ORDER — HYDROCHLOROTHIAZIDE 25 MG/1
25 TABLET ORAL DAILY
Qty: 90 TABLET | Refills: 0 | Status: SHIPPED | OUTPATIENT
Start: 2023-06-26

## 2023-06-26 RX ORDER — AMLODIPINE BESYLATE 5 MG/1
5 TABLET ORAL DAILY
Qty: 90 TABLET | Refills: 0 | Status: SHIPPED | OUTPATIENT
Start: 2023-06-26

## 2023-06-26 RX ORDER — LEVOTHYROXINE SODIUM 0.12 MG/1
TABLET ORAL
Qty: 90 TABLET | Refills: 0 | Status: SHIPPED | OUTPATIENT
Start: 2023-06-26

## 2023-06-26 ASSESSMENT — ENCOUNTER SYMPTOMS
NAUSEA: 0
VOMITING: 0
RHINORRHEA: 0
ABDOMINAL PAIN: 0
COUGH: 0
SHORTNESS OF BREATH: 0

## 2023-06-27 LAB
ALBUMIN SERPL-MCNC: 4.6 G/DL (ref 3.8–4.9)
ALBUMIN/GLOB SERPL: 1.5 {RATIO} (ref 1.2–2.2)
ALP SERPL-CCNC: 71 IU/L (ref 44–121)
ALT SERPL-CCNC: 16 IU/L (ref 0–32)
AST SERPL-CCNC: 16 IU/L (ref 0–40)
BILIRUB SERPL-MCNC: 0.6 MG/DL (ref 0–1.2)
BUN SERPL-MCNC: 16 MG/DL (ref 6–24)
BUN/CREAT SERPL: 17 (ref 9–23)
CALCIUM SERPL-MCNC: 9.3 MG/DL (ref 8.7–10.2)
CHLORIDE SERPL-SCNC: 101 MMOL/L (ref 96–106)
CHOLEST SERPL-MCNC: 223 MG/DL (ref 100–199)
CO2 SERPL-SCNC: 25 MMOL/L (ref 20–29)
CREAT SERPL-MCNC: 0.93 MG/DL (ref 0.57–1)
EGFRCR SERPLBLD CKD-EPI 2021: 74 ML/MIN/1.73
ERYTHROCYTE [DISTWIDTH] IN BLOOD BY AUTOMATED COUNT: 13.1 % (ref 11.7–15.4)
GLOBULIN SER CALC-MCNC: 3 G/DL (ref 1.5–4.5)
GLUCOSE SERPL-MCNC: 104 MG/DL (ref 70–99)
HCT VFR BLD AUTO: 38.7 % (ref 34–46.6)
HDLC SERPL-MCNC: 50 MG/DL
HGB BLD-MCNC: 13.1 G/DL (ref 11.1–15.9)
LDLC SERPL CALC-MCNC: 159 MG/DL (ref 0–99)
MCH RBC QN AUTO: 29.4 PG (ref 26.6–33)
MCHC RBC AUTO-ENTMCNC: 33.9 G/DL (ref 31.5–35.7)
MCV RBC AUTO: 87 FL (ref 79–97)
PLATELET # BLD AUTO: 341 X10E3/UL (ref 150–450)
POTASSIUM SERPL-SCNC: 3.9 MMOL/L (ref 3.5–5.2)
PROT SERPL-MCNC: 7.6 G/DL (ref 6–8.5)
RBC # BLD AUTO: 4.45 X10E6/UL (ref 3.77–5.28)
SODIUM SERPL-SCNC: 140 MMOL/L (ref 134–144)
TRIGL SERPL-MCNC: 82 MG/DL (ref 0–149)
TSH SERPL DL<=0.005 MIU/L-ACNC: 3.29 UIU/ML (ref 0.45–4.5)
VLDLC SERPL CALC-MCNC: 14 MG/DL (ref 5–40)
WBC # BLD AUTO: 4.1 X10E3/UL (ref 3.4–10.8)

## 2023-06-28 LAB
ALBUMIN/CREAT UR: 8 MG/G CREAT (ref 0–29)
CREAT UR-MCNC: 129.9 MG/DL
MICROALBUMIN UR-MCNC: 10.9 UG/ML

## 2023-07-05 PROBLEM — K57.90 DIVERTICULOSIS: Status: ACTIVE | Noted: 2023-07-05

## 2023-07-22 DIAGNOSIS — E89.0 POSTOPERATIVE HYPOTHYROIDISM: ICD-10-CM

## 2023-07-24 RX ORDER — LEVOTHYROXINE SODIUM 0.12 MG/1
TABLET ORAL
Qty: 90 TABLET | Refills: 1 | Status: SHIPPED | OUTPATIENT
Start: 2023-07-24

## 2023-07-24 NOTE — TELEPHONE ENCOUNTER
Dale Mejia MD  Medication filled on 1/15/2023 by Jorge Tinoco NP  Last seen on 6/26/2023 by Allyson White MD.  Office Visit on 06/26/2023   Component Date Value Ref Range Status    Glucose 06/26/2023 104 (H)  70 - 99 mg/dL Final    BUN 06/26/2023 16  6 - 24 mg/dL Final    Creatinine 06/26/2023 0.93  0.57 - 1.00 mg/dL Final    Est, Glomerular Filtration Rate 06/26/2023 74  >59 mL/min/1.73 Final    BUN/Creatinine Ratio 06/26/2023 17  9 - 23 Final    Sodium 06/26/2023 140  134 - 144 mmol/L Final    Potassium 06/26/2023 3.9  3.5 - 5.2 mmol/L Final    Chloride 06/26/2023 101  96 - 106 mmol/L Final    CO2 06/26/2023 25  20 - 29 mmol/L Final    Calcium 06/26/2023 9.3  8.7 - 10.2 mg/dL Final    Total Protein 06/26/2023 7.6  6.0 - 8.5 g/dL Final    Albumin 06/26/2023 4.6  3.8 - 4.9 g/dL Final    Globulin, Total 06/26/2023 3.0  1.5 - 4.5 g/dL Final    Albumin/Globulin Ratio 06/26/2023 1.5  1.2 - 2.2 Final    Total Bilirubin 06/26/2023 0.6  0.0 - 1.2 mg/dL Final    Alkaline Phosphatase 06/26/2023 71  44 - 121 IU/L Final    AST 06/26/2023 16  0 - 40 IU/L Final    ALT 06/26/2023 16  0 - 32 IU/L Final    WBC 06/26/2023 4.1  3.4 - 10.8 x10E3/uL Final    RBC 06/26/2023 4.45  3.77 - 5.28 x10E6/uL Final    Hemoglobin 06/26/2023 13.1  11.1 - 15.9 g/dL Final    Hematocrit 06/26/2023 38.7  34.0 - 46.6 % Final    MCV 06/26/2023 87  79 - 97 fL Final    MCH 06/26/2023 29.4  26.6 - 33.0 pg Final    MCHC 06/26/2023 33.9  31.5 - 35.7 g/dL Final    RDW 06/26/2023 13.1  11.7 - 15.4 % Final    Platelets 46/42/6798 341  150 - 450 x10E3/uL Final    Cholesterol 06/26/2023 223 (H)  100 - 199 mg/dL Final    Triglycerides 06/26/2023 82  0 - 149 mg/dL Final    HDL 06/26/2023 50  >39 mg/dL Final    VLDL Cholesterol Calculated 06/26/2023 14  5 - 40 mg/dL Final    LDL Calculated 06/26/2023 159 (H)  0 - 99 mg/dL Final    TSH 06/26/2023 3.290  0.450 - 4.500 uIU/mL Final    Creatinine, Ur 06/26/2023 129.9  Not Estab. mg/dL Final    Albumin, U 06/26/2023 10.9

## 2023-08-17 DIAGNOSIS — I10 ESSENTIAL (PRIMARY) HYPERTENSION: ICD-10-CM

## 2023-08-17 RX ORDER — HYDROCHLOROTHIAZIDE 25 MG/1
25 TABLET ORAL DAILY
Qty: 90 TABLET | Refills: 1 | Status: SHIPPED | OUTPATIENT
Start: 2023-08-17

## 2023-08-17 RX ORDER — AMLODIPINE BESYLATE 5 MG/1
5 TABLET ORAL DAILY
Qty: 90 TABLET | Refills: 1 | Status: SHIPPED | OUTPATIENT
Start: 2023-08-17

## 2023-08-17 NOTE — TELEPHONE ENCOUNTER
PCP: Nikki Talbert MD    Last appt: [unfilled]  Future Appointments   Date Time Provider [address]   8/21/2023  3:15 PM [facility]  [address]       Requested Prescriptions     Pending Prescriptions Disp Refills    amLODIPine (NORVASC) 5 MG tablet [Pharmacy Med Name: AMLODIPINE BESYLATE 5MG TABLETS] 90 tablet 0     Sig: TAKE 1 TABLET BY MOUTH DAILY    hydroCHLOROthiazide (HYDRODIURIL) 25 MG tablet [Pharmacy Med Name: HYDROCHLOROTHIAZIDE 25MG TABLETS] 90 tablet 0     Sig: TAKE 1 TABLET BY MOUTH DAILY       Prior labs and Blood pressures:  BP Readings from Last 3 Encounters:   06/26/23 125/83   03/02/23 (!) 147/95   06/27/22 (!) 130/91     Lab Results   Component Value Date/Time     06/26/2023 12:00 AM    K 3.9 06/26/2023 12:00 AM     06/26/2023 12:00 AM    CO2 25 06/26/2023 12:00 AM    BUN 16 06/26/2023 12:00 AM    GFRAA >60 05/06/2021 01:49 PM     No results found for: HBA1C, [id number]  Lab Results   Component Value Date/Time    CHOL 223 06/26/2023 12:00 AM    CHOL 200 05/06/2021 01:49 PM    HDL 50 06/26/2023 12:00 AM    VLDL 11 03/03/2021 12:00 AM     No results found for: VITD3, VD3RIA    Lab Results   Component Value Date/Time    TSH 3.290 06/26/2023 12:00 AM

## 2023-08-21 ENCOUNTER — HOSPITAL ENCOUNTER (OUTPATIENT)
Facility: HOSPITAL | Age: 53
Discharge: HOME | End: 2023-08-24
Attending: STUDENT IN AN ORGANIZED HEALTH CARE EDUCATION/TRAINING PROGRAM
Payer: COMMERCIAL

## 2023-08-21 DIAGNOSIS — Z12.31 BREAST CANCER SCREENING BY MAMMOGRAM: ICD-10-CM

## 2023-08-21 PROCEDURE — 77067 SCR MAMMO BI INCL CAD: CPT

## 2023-09-16 DIAGNOSIS — E89.0 POSTOPERATIVE HYPOTHYROIDISM: ICD-10-CM

## 2023-09-19 RX ORDER — LEVOTHYROXINE SODIUM 0.12 MG/1
TABLET ORAL
Qty: 90 TABLET | Refills: 1 | OUTPATIENT
Start: 2023-09-19

## 2024-01-15 ENCOUNTER — OFFICE VISIT (OUTPATIENT)
Facility: CLINIC | Age: 54
End: 2024-01-15
Payer: COMMERCIAL

## 2024-01-15 VITALS
SYSTOLIC BLOOD PRESSURE: 146 MMHG | OXYGEN SATURATION: 99 % | TEMPERATURE: 97.6 F | RESPIRATION RATE: 16 BRPM | BODY MASS INDEX: 29.19 KG/M2 | HEIGHT: 64 IN | DIASTOLIC BLOOD PRESSURE: 82 MMHG | HEART RATE: 91 BPM | WEIGHT: 171 LBS

## 2024-01-15 DIAGNOSIS — R19.06 EPIGASTRIC MASS: Primary | ICD-10-CM

## 2024-01-15 PROCEDURE — 3079F DIAST BP 80-89 MM HG: CPT | Performed by: NURSE PRACTITIONER

## 2024-01-15 PROCEDURE — 99214 OFFICE O/P EST MOD 30 MIN: CPT | Performed by: NURSE PRACTITIONER

## 2024-01-15 PROCEDURE — 3077F SYST BP >= 140 MM HG: CPT | Performed by: NURSE PRACTITIONER

## 2024-01-15 SDOH — ECONOMIC STABILITY: FOOD INSECURITY: WITHIN THE PAST 12 MONTHS, YOU WORRIED THAT YOUR FOOD WOULD RUN OUT BEFORE YOU GOT MONEY TO BUY MORE.: NEVER TRUE

## 2024-01-15 SDOH — ECONOMIC STABILITY: INCOME INSECURITY: HOW HARD IS IT FOR YOU TO PAY FOR THE VERY BASICS LIKE FOOD, HOUSING, MEDICAL CARE, AND HEATING?: NOT HARD AT ALL

## 2024-01-15 SDOH — ECONOMIC STABILITY: HOUSING INSECURITY
IN THE LAST 12 MONTHS, WAS THERE A TIME WHEN YOU DID NOT HAVE A STEADY PLACE TO SLEEP OR SLEPT IN A SHELTER (INCLUDING NOW)?: NO

## 2024-01-15 SDOH — ECONOMIC STABILITY: FOOD INSECURITY: WITHIN THE PAST 12 MONTHS, THE FOOD YOU BOUGHT JUST DIDN'T LAST AND YOU DIDN'T HAVE MONEY TO GET MORE.: NEVER TRUE

## 2024-01-15 ASSESSMENT — PATIENT HEALTH QUESTIONNAIRE - PHQ9
SUM OF ALL RESPONSES TO PHQ QUESTIONS 1-9: 0
SUM OF ALL RESPONSES TO PHQ QUESTIONS 1-9: 0
SUM OF ALL RESPONSES TO PHQ9 QUESTIONS 1 & 2: 0
SUM OF ALL RESPONSES TO PHQ QUESTIONS 1-9: 0
SUM OF ALL RESPONSES TO PHQ QUESTIONS 1-9: 0
2. FEELING DOWN, DEPRESSED OR HOPELESS: 0
1. LITTLE INTEREST OR PLEASURE IN DOING THINGS: 0

## 2024-01-15 ASSESSMENT — ENCOUNTER SYMPTOMS: CONSTIPATION: 1

## 2024-01-15 NOTE — PROGRESS NOTES
Assessment/Plan:     Larisa was seen today for mass and constipation.    Diagnoses and all orders for this visit:    Epigastric mass  -     CT ABDOMEN W WO CONTRAST; Future  -     AFL - Ac Franks MD, Gastroenterology, [place] ([facility] El)  Patient was seen in my office with complaint of which she felt as a mass in her middle abdomen between her umbilicus and xiphoid. Patient history of laparoscopic surgery, CoolSculpting of abdomen. On exam, when engaging abdominal muscles I could see and palpate small lump below 1 of the surgical sites. Patient could have scar tissue or possible hernia formation. There is no pain. She has no change of appetite. She does report some chronic constipation. She reports history of diverticulitis. Will order CT for further evaluation as well as referral to GI for colonoscopy and follow up with diverticulitis. She denies any blood in her bowel movements or urine. She denies any nausea or vomiting.       No follow-up provider specified.    Discussed expected course/resolution/complications of diagnosis in detail with patient.    Medication risks/benefits/costs/interactions/alternatives discussed with patient.    Pt was given after visit summary which includes diagnoses, current medications & vitals.   Pt expressed understanding with the diagnosis and plan        Subjective:      Larisa Heart is a 53 y.o. female who presents for had concerns including Mass (Abdomen area above naval x1 month. ) and Constipation (Last bowl movement yesterday).     Current Outpatient Medications   Medication Sig Dispense Refill   • amLODIPine (NORVASC) 5 MG tablet TAKE 1 TABLET BY MOUTH DAILY 90 tablet 1   • hydroCHLOROthiazide (HYDRODIURIL) 25 MG tablet TAKE 1 TABLET BY MOUTH DAILY 90 tablet 1   • levothyroxine (SYNTHROID) 125 MCG tablet TAKE 1 TABLET BY MOUTH EVERY DAY BEFORE BREAKFAST 90 tablet 1     No current facility-administered medications for this visit.       No Known Allergies

## 2024-01-15 NOTE — PROGRESS NOTES
1. Have you been to the ER, urgent care clinic since your last visit?  Hospitalized since your last visit?No    2. Have you seen or consulted any other health care providers outside of the [facility] since your last visit?  Include any pap smears or colon screening. No    Chief Complaint   Patient presents with    Mass     Abdomen area above naval x1 month.     Constipation     Last bowl movement yesterday     Health Maintenance Due   Topic Date Due    Hepatitis B vaccine (1 of 3 - 3-dose series) Never done    Pneumococcal 0-64 years Vaccine (1 - PCV) Never done    HIV screen  Never done    DTaP/Tdap/Td vaccine (1 - Tdap) Never done    Shingles vaccine (1 of 2) Never done    Flu vaccine (1) Never done    COVID-19 Vaccine (3 - 2023-24 season) 09/01/2023     PHQ-9 Total Score: 0 (1/15/2024 11:22 AM)        1/15/2024    11:00 AM   AMB Abuse Screening   Do you ever feel afraid of your partner? N   Are you in a relationship with someone who physically or mentally threatens you? N   Is it safe for you to go home? Y     Vitals:    01/15/24 1122   BP: (!) 146/82   Pulse: 91   Resp: 16   Temp: 97.6 °F (36.4 °C)   SpO2: 99%

## 2024-02-05 ENCOUNTER — TELEPHONE (OUTPATIENT)
Facility: CLINIC | Age: 54
End: 2024-02-05

## 2024-02-05 NOTE — TELEPHONE ENCOUNTER
Message from pt  Topic: [facility] Billing Questions.     I recieved notification that my pre-auth was denied two tests were ordered and not one. [employer] advised that they would approve the test if only one test, with contrast is submitted. Can that be done, if not, next steps?     I contacted the office on 2/2 at 11:49am requesting an update. Please advise.

## 2024-02-07 ENCOUNTER — PATIENT MESSAGE (OUTPATIENT)
Facility: CLINIC | Age: 54
End: 2024-02-07

## 2024-02-09 NOTE — TELEPHONE ENCOUNTER
Contacted patients insurance CT was denied however [facility] was not the practice that requested the CT be authorization it was Dr Bass office. [facility] did state they would approve CPT 41337. LVM for patient.

## 2024-02-09 NOTE — TELEPHONE ENCOUNTER
Contacted patients insurance CT was denied however [facility] was not the practice that requested the CT be authorization it was Dr Bass office. [facility] did state they would approve CPT 35008.

## 2024-02-09 NOTE — TELEPHONE ENCOUNTER
Spoke to patient she has never seen Dr Bass. She is unsure why the insurance would have   Started the PA in his name.She wants this tests ordered ASAP . Please put new order in for   CPT 22137

## 2024-02-12 DIAGNOSIS — R19.06 EPIGASTRIC MASS: Primary | ICD-10-CM

## 2024-02-12 NOTE — TELEPHONE ENCOUNTER
CT scheduled 2/19/24 @ 430pm Nothing eat 4 hours to test can have clear liquids. Spoke to patient gave her appt information

## 2024-02-21 DIAGNOSIS — R19.06 EPIGASTRIC MASS: Primary | ICD-10-CM

## 2024-03-01 ENCOUNTER — TELEPHONE (OUTPATIENT)
Facility: CLINIC | Age: 54
End: 2024-03-01

## 2024-03-23 DIAGNOSIS — I10 ESSENTIAL (PRIMARY) HYPERTENSION: ICD-10-CM

## 2024-03-25 RX ORDER — HYDROCHLOROTHIAZIDE 25 MG/1
25 TABLET ORAL DAILY
Qty: 30 TABLET | Refills: 0 | Status: SHIPPED | OUTPATIENT
Start: 2024-03-25

## 2024-03-25 RX ORDER — AMLODIPINE BESYLATE 5 MG/1
5 TABLET ORAL DAILY
Qty: 30 TABLET | Refills: 0 | Status: SHIPPED | OUTPATIENT
Start: 2024-03-25

## 2024-03-25 NOTE — TELEPHONE ENCOUNTER
Chief Complaint   Patient presents with    Medication Refill       Requested Prescriptions     Pending Prescriptions Disp Refills    hydroCHLOROthiazide (HYDRODIURIL) 25 MG tablet [Pharmacy Med Name: HYDROCHLOROTHIAZIDE 25MG TABLETS] 90 tablet 1     Sig: TAKE 1 TABLET BY MOUTH DAILY    amLODIPine (NORVASC) 5 MG tablet [Pharmacy Med Name: AMLODIPINE BESYLATE 5MG TABLETS] 90 tablet 1     Sig: TAKE 1 TABLET BY MOUTH DAILY       Allergies:  No Known Allergies    Last visit with clinic:  1/15/2024   Next visit with clinic: Visit date not found     Last visit with this provider: 6/26/2023   Next Visit with this provider: Visit date not found    Signed by Hayley Blas MA CMA  03/25/24  9:59 AM

## 2024-04-22 ENCOUNTER — OFFICE VISIT (OUTPATIENT)
Facility: CLINIC | Age: 54
End: 2024-04-22
Payer: COMMERCIAL

## 2024-04-22 VITALS
OXYGEN SATURATION: 99 % | SYSTOLIC BLOOD PRESSURE: 128 MMHG | WEIGHT: 173.4 LBS | RESPIRATION RATE: 16 BRPM | HEART RATE: 88 BPM | BODY MASS INDEX: 29.6 KG/M2 | DIASTOLIC BLOOD PRESSURE: 79 MMHG | TEMPERATURE: 98.1 F | HEIGHT: 64 IN

## 2024-04-22 DIAGNOSIS — Z11.4 ENCOUNTER FOR SCREENING FOR HIV: ICD-10-CM

## 2024-04-22 DIAGNOSIS — I10 ESSENTIAL (PRIMARY) HYPERTENSION: ICD-10-CM

## 2024-04-22 DIAGNOSIS — Z13.1 DIABETES MELLITUS SCREENING: ICD-10-CM

## 2024-04-22 DIAGNOSIS — E78.2 MIXED HYPERLIPIDEMIA: ICD-10-CM

## 2024-04-22 DIAGNOSIS — M25.562 POSTERIOR KNEE PAIN, LEFT: ICD-10-CM

## 2024-04-22 DIAGNOSIS — E89.0 POSTOPERATIVE HYPOTHYROIDISM: ICD-10-CM

## 2024-04-22 DIAGNOSIS — I10 ESSENTIAL (PRIMARY) HYPERTENSION: Primary | ICD-10-CM

## 2024-04-22 PROCEDURE — 99214 OFFICE O/P EST MOD 30 MIN: CPT | Performed by: STUDENT IN AN ORGANIZED HEALTH CARE EDUCATION/TRAINING PROGRAM

## 2024-04-22 PROCEDURE — 3074F SYST BP LT 130 MM HG: CPT | Performed by: STUDENT IN AN ORGANIZED HEALTH CARE EDUCATION/TRAINING PROGRAM

## 2024-04-22 PROCEDURE — 3078F DIAST BP <80 MM HG: CPT | Performed by: STUDENT IN AN ORGANIZED HEALTH CARE EDUCATION/TRAINING PROGRAM

## 2024-04-22 RX ORDER — LEVOTHYROXINE SODIUM 0.12 MG/1
TABLET ORAL
Qty: 90 TABLET | Refills: 1 | Status: SHIPPED | OUTPATIENT
Start: 2024-04-22

## 2024-04-22 RX ORDER — AMLODIPINE BESYLATE 5 MG/1
5 TABLET ORAL DAILY
Qty: 90 TABLET | Refills: 1 | Status: SHIPPED | OUTPATIENT
Start: 2024-04-22

## 2024-04-22 RX ORDER — HYDROCHLOROTHIAZIDE 25 MG/1
25 TABLET ORAL DAILY
Qty: 90 TABLET | Refills: 1 | Status: SHIPPED | OUTPATIENT
Start: 2024-04-22

## 2024-04-22 ASSESSMENT — ENCOUNTER SYMPTOMS
NAUSEA: 0
VOMITING: 0
RHINORRHEA: 0
ABDOMINAL PAIN: 0
SHORTNESS OF BREATH: 0
COUGH: 0

## 2024-04-22 NOTE — PROGRESS NOTES
\"Have you been to the ER, urgent care clinic since your last visit?  Hospitalized since your last visit?\"    no    “Have you seen or consulted any other health care providers outside of [facility] since your last visit?”    no            Click Here for Release of Records Request   
Year: Not on file     Number of Places Lived in the Last Year: Not on file     Unstable Housing in the Last Year: No        Patient is a 53-year-old female who presents to the office today for follow-up of chronic conditions.  Patient states her home blood pressure readings are usually well-controlled around the 120s systolic and 80s diastolic.  She is due for routine lab work today as well as medication refill.  Patient also states that about a month ago she noted swelling in her left knee primarily in the posterior portion.  She states this was consistent with a Baker's cyst.  She also had edema in that leg which has since resolved.  She is currently asymptomatic.  She does have a previous history of a meniscal tear about 20 years ago.  She states this has come and gone previously intermittently.  There is discomfort when symptoms occur.  She also states she is still awaiting an abdominal CT scan due to concerns for possible hernia.  Uncertain if this could be hernia versus diastasis recti.  She denies any pain or discomfort or any GI symptoms.  Questions about insurance coverage.  Advised patient to call her insurance company to further clarify given the order has already been placed in the system.      ROS:   Review of Systems   Constitutional:  Negative for chills and fever.   HENT:  Negative for rhinorrhea.    Respiratory:  Negative for cough and shortness of breath.    Cardiovascular:  Negative for chest pain and leg swelling.   Gastrointestinal:  Negative for abdominal pain, nausea and vomiting.   Neurological:  Negative for dizziness, weakness, numbness and headaches.         Objective:     Vitals:    04/22/24 1308   BP: 128/79   Pulse: 88   Resp: 16   Temp: 98.1 °F (36.7 °C)   SpO2: 99%          Vitals and Nurse Documentation reviewed.     Physical Exam  Constitutional:       General: She is not in acute distress.     Appearance: Normal appearance. She is overweight. She is not ill-appearing.   HENT:

## 2024-06-29 DIAGNOSIS — E89.0 POSTOPERATIVE HYPOTHYROIDISM: ICD-10-CM

## 2024-07-02 RX ORDER — LEVOTHYROXINE SODIUM 0.12 MG/1
TABLET ORAL
Qty: 90 TABLET | Refills: 1 | OUTPATIENT
Start: 2024-07-02

## 2024-08-27 ENCOUNTER — HOSPITAL ENCOUNTER (OUTPATIENT)
Facility: HOSPITAL | Age: 54
Discharge: HOME | End: 2024-08-30
Payer: COMMERCIAL

## 2024-08-27 DIAGNOSIS — Z12.31 SCREENING MAMMOGRAM FOR BREAST CANCER: ICD-10-CM

## 2024-08-27 PROCEDURE — 77063 BREAST TOMOSYNTHESIS BI: CPT

## 2024-11-02 DIAGNOSIS — I10 ESSENTIAL (PRIMARY) HYPERTENSION: ICD-10-CM

## 2024-11-04 RX ORDER — HYDROCHLOROTHIAZIDE 25 MG/1
25 TABLET ORAL DAILY
Qty: 90 TABLET | Refills: 1 | OUTPATIENT
Start: 2024-11-04

## 2024-11-04 RX ORDER — AMLODIPINE BESYLATE 5 MG/1
5 TABLET ORAL DAILY
Qty: 90 TABLET | Refills: 1 | OUTPATIENT
Start: 2024-11-04

## 2024-11-08 ENCOUNTER — TELEPHONE (OUTPATIENT)
Facility: CLINIC | Age: 54
End: 2024-11-08

## 2024-11-08 DIAGNOSIS — E89.0 POSTOPERATIVE HYPOTHYROIDISM: ICD-10-CM

## 2024-11-08 DIAGNOSIS — I10 ESSENTIAL (PRIMARY) HYPERTENSION: ICD-10-CM

## 2024-11-08 NOTE — TELEPHONE ENCOUNTER
Verbal order giving for Lab order on 4/23/2024 to Lab tech Shyam Mac.    Orders sent via fax [phone]

## 2024-11-08 NOTE — TELEPHONE ENCOUNTER
Patient requesting that her lab order need to be sent to   [facility]. Request has been.    [phone]

## 2024-11-11 RX ORDER — LEVOTHYROXINE SODIUM 125 UG/1
125 TABLET ORAL
Qty: 10 TABLET | Refills: 0 | Status: SHIPPED | OUTPATIENT
Start: 2024-11-11 | End: 2024-11-14 | Stop reason: DRUGHIGH

## 2024-11-11 RX ORDER — AMLODIPINE BESYLATE 5 MG/1
5 TABLET ORAL DAILY
Qty: 30 TABLET | Refills: 0 | Status: SHIPPED | OUTPATIENT
Start: 2024-11-11 | End: 2024-11-14 | Stop reason: SDUPTHER

## 2024-11-11 RX ORDER — HYDROCHLOROTHIAZIDE 25 MG/1
25 TABLET ORAL DAILY
Qty: 30 TABLET | Refills: 0 | Status: SHIPPED | OUTPATIENT
Start: 2024-11-11 | End: 2024-11-14 | Stop reason: SDUPTHER

## 2024-11-11 NOTE — TELEPHONE ENCOUNTER
PCP: Elissa Nelson MD    Last appt: [unfilled]  Future Appointments   Date Time Provider Department Center   11/14/2024  9:20 AM Elissa Nelson MD [facility] DEP       Requested Prescriptions     Pending Prescriptions Disp Refills    amLODIPine (NORVASC) 5 MG tablet [Pharmacy Med Name: AMLODIPINE BESYLATE TABS 5MG]  0    hydroCHLOROthiazide (HYDRODIURIL) 25 MG tablet [Pharmacy Med Name: HYDROCHLOROTHIAZIDE TABS 25MG]  0    levothyroxine (SYNTHROID) 125 MCG tablet [Pharmacy Med Name: L-THYROXINE (SYNTHROID) TABS 125MCG]  0       Prior labs and Blood pressures:  BP Readings from Last 3 Encounters:   04/22/24 128/79   01/15/24 (!) 146/82   06/26/23 125/83     Lab Results   Component Value Date/Time     06/26/2023 12:00 AM    K 3.9 06/26/2023 12:00 AM     06/26/2023 12:00 AM    CO2 25 06/26/2023 12:00 AM    BUN 16 06/26/2023 12:00 AM    GFRAA >60 05/06/2021 01:49 PM     No results found for: \"HBA1C\", \"[id number]\"  Lab Results   Component Value Date/Time    CHOL 223 06/26/2023 12:00 AM    CHOL 200 05/06/2021 01:49 PM    HDL 50 06/26/2023 12:00 AM     06/26/2023 12:00 AM    VLDL 14 06/26/2023 12:00 AM    VLDL 11 03/03/2021 12:00 AM     No results found for: \"VITD3\"    Lab Results   Component Value Date/Time    TSH 3.290 06/26/2023 12:00 AM

## 2024-11-14 ENCOUNTER — OFFICE VISIT (OUTPATIENT)
Facility: CLINIC | Age: 54
End: 2024-11-14

## 2024-11-14 VITALS
BODY MASS INDEX: 29.74 KG/M2 | HEIGHT: 64 IN | HEART RATE: 99 BPM | DIASTOLIC BLOOD PRESSURE: 77 MMHG | RESPIRATION RATE: 20 BRPM | TEMPERATURE: 97 F | WEIGHT: 174.2 LBS | OXYGEN SATURATION: 99 % | SYSTOLIC BLOOD PRESSURE: 156 MMHG

## 2024-11-14 DIAGNOSIS — R00.2 PALPITATIONS: ICD-10-CM

## 2024-11-14 DIAGNOSIS — R35.0 FREQUENT URINATION: ICD-10-CM

## 2024-11-14 DIAGNOSIS — Z23 IMMUNIZATION DUE: ICD-10-CM

## 2024-11-14 DIAGNOSIS — E89.0 POSTOPERATIVE HYPOTHYROIDISM: ICD-10-CM

## 2024-11-14 DIAGNOSIS — I10 ESSENTIAL (PRIMARY) HYPERTENSION: Primary | ICD-10-CM

## 2024-11-14 DIAGNOSIS — E78.2 MIXED HYPERLIPIDEMIA: ICD-10-CM

## 2024-11-14 PROBLEM — R73.03 PREDIABETES: Status: ACTIVE | Noted: 2024-11-14

## 2024-11-14 LAB
BILIRUBIN, URINE, POC: NEGATIVE
BLOOD URINE, POC: NORMAL
GLUCOSE URINE, POC: NEGATIVE
KETONES, URINE, POC: NEGATIVE
LEUKOCYTE ESTERASE, URINE, POC: NEGATIVE
NITRITE, URINE, POC: NEGATIVE
PH, URINE, POC: 5.5 (ref 4.6–8)
PROTEIN,URINE, POC: NEGATIVE
SPECIFIC GRAVITY, URINE, POC: 1.02 (ref 1–1.03)
URINALYSIS CLARITY, POC: CLEAR
URINALYSIS COLOR, POC: YELLOW
UROBILINOGEN, POC: NORMAL MG/DL

## 2024-11-14 RX ORDER — HYDROCHLOROTHIAZIDE 25 MG/1
25 TABLET ORAL DAILY
Qty: 90 TABLET | Refills: 1 | Status: SHIPPED | OUTPATIENT
Start: 2024-11-14

## 2024-11-14 RX ORDER — LEVOTHYROXINE SODIUM 112 UG/1
112 TABLET ORAL
Qty: 90 TABLET | Refills: 0 | Status: SHIPPED | OUTPATIENT
Start: 2024-11-14

## 2024-11-14 RX ORDER — AMLODIPINE BESYLATE 5 MG/1
5 TABLET ORAL DAILY
Qty: 90 TABLET | Refills: 1 | Status: SHIPPED | OUTPATIENT
Start: 2024-11-14

## 2024-11-14 ASSESSMENT — ENCOUNTER SYMPTOMS
VOMITING: 0
COUGH: 0
NAUSEA: 0
ABDOMINAL PAIN: 0
RHINORRHEA: 0
SHORTNESS OF BREATH: 0

## 2024-11-14 NOTE — PROGRESS NOTES
dentified pt with two pt identifiers(name and ).    Chief Complaint   Patient presents with    6 Month Follow-Up     Patient here for a 6 months follow up.  Amlodipine & HTCZ  Also, discuss Menopause         Health Maintenance Due   Topic    Pneumococcal 0-64 years Vaccine (1 of 2 - PCV)    HIV screen     Hepatitis B vaccine (1 of 3 - 19+ 3-dose series)    DTaP/Tdap/Td vaccine (1 - Tdap)    Shingles vaccine (1 of 2)    Diabetes screen     Flu vaccine (1)    COVID-19 Vaccine (3 -  season)       Wt Readings from Last 3 Encounters:   24 79 kg (174 lb 3.2 oz)   24 78.7 kg (173 lb 6.4 oz)   01/15/24 77.6 kg (171 lb)     Temp Readings from Last 3 Encounters:   24 97 °F (36.1 °C) (Temporal)   24 98.1 °F (36.7 °C) (Temporal)   01/15/24 97.6 °F (36.4 °C) (Skin)     BP Readings from Last 3 Encounters:   24 (!) 152/82   24 128/79   01/15/24 (!) 146/82     Pulse Readings from Last 3 Encounters:   24 99   24 88   01/15/24 91           Coordination of Care Questionnaire:  :   1. \"Have you been to the ER, urgent care clinic since your last visit?  Hospitalized since your last visit?\" no    2. \"Have you seen or consulted any other health care providers outside of the [facility] since your last visit?\" no     3. For patients aged 45-75: Has the patient had a colonoscopy / FIT/ Cologuard? no      If the patient is female:    4. For patients aged 40-74: Has the patient had a mammogram within the past 2 years? no      5. For patients aged 21-65: Has the patient had a pap smear? no     3) Do you have an Advance Directive on file? no  Are you interested in receiving information about Advance Directives? no    Patient is accompanied by self I have received verbal consent from Larisa Heart to discuss any/all medical information while they are present in the room.   
sounds are normal. There is no distension.      Palpations: Abdomen is soft.      Tenderness: There is no abdominal tenderness. There is no guarding or rebound.   Musculoskeletal:      Cervical back: Neck supple.      Right lower leg: No edema.      Left lower leg: No edema.   Neurological:      Mental Status: She is alert and oriented to person, place, and time.      Gait: Gait normal.         Results for orders placed or performed in visit on 11/14/24   AMB POC URINALYSIS DIP STICK AUTO W/ MICRO   Result Value Ref Range    Color (UA POC) Yellow     Clarity (UA POC) Clear     Glucose, Urine, POC Negative     Bilirubin, Urine, POC Negative     Ketones, Urine, POC Negative     Specific Gravity, Urine, POC 1.025 1.001 - 1.035    Blood (UA POC) Moderate     pH, Urine, POC 5.5 4.6 - 8.0    Protein, Urine, POC Negative     Urobilinogen, POC 0.2 mg/dL <1.1 mg/dL    Nitrite, Urine, POC Negative Negative    Leukocyte Esterase, Urine, POC Negative

## 2024-11-15 LAB
APPEARANCE UR: CLEAR
BACTERIA #/AREA URNS HPF: NORMAL /[HPF]
BILIRUB UR QL STRIP: NEGATIVE
CASTS URNS QL MICRO: NORMAL /LPF
COLOR UR: YELLOW
EPI CELLS #/AREA URNS HPF: NORMAL /HPF (ref 0–10)
GLUCOSE UR QL STRIP: NEGATIVE
HGB UR QL STRIP: ABNORMAL
KETONES UR QL STRIP: NEGATIVE
LEUKOCYTE ESTERASE UR QL STRIP: NEGATIVE
MICRO URNS: ABNORMAL
NITRITE UR QL STRIP: NEGATIVE
PH UR STRIP: 5.5 [PH] (ref 5–7.5)
PROT UR QL STRIP: NEGATIVE
RBC #/AREA URNS HPF: NORMAL /HPF (ref 0–2)
SP GR UR STRIP: 1.02 (ref 1–1.03)
UROBILINOGEN UR STRIP-MCNC: 0.2 MG/DL (ref 0.2–1)
WBC #/AREA URNS HPF: NORMAL /HPF (ref 0–5)

## 2024-11-19 LAB — BACTERIA UR CULT: NORMAL

## 2024-11-26 ENCOUNTER — HOSPITAL ENCOUNTER (OUTPATIENT)
Facility: HOSPITAL | Age: 54
Discharge: HOME | End: 2024-11-29
Payer: COMMERCIAL

## 2024-11-26 DIAGNOSIS — M79.605 LEFT LEG PAIN: ICD-10-CM

## 2024-11-26 PROCEDURE — 93971 EXTREMITY STUDY: CPT
